# Patient Record
Sex: MALE | Race: BLACK OR AFRICAN AMERICAN | NOT HISPANIC OR LATINO | Employment: FULL TIME | URBAN - METROPOLITAN AREA
[De-identification: names, ages, dates, MRNs, and addresses within clinical notes are randomized per-mention and may not be internally consistent; named-entity substitution may affect disease eponyms.]

---

## 2019-07-12 ENCOUNTER — HOSPITAL ENCOUNTER (EMERGENCY)
Facility: HOSPITAL | Age: 29
Discharge: HOME | End: 2019-07-12
Attending: EMERGENCY MEDICINE
Payer: COMMERCIAL

## 2019-07-12 VITALS
HEIGHT: 75 IN | WEIGHT: 205 LBS | SYSTOLIC BLOOD PRESSURE: 128 MMHG | OXYGEN SATURATION: 98 % | HEART RATE: 70 BPM | TEMPERATURE: 98 F | BODY MASS INDEX: 25.49 KG/M2 | DIASTOLIC BLOOD PRESSURE: 60 MMHG | RESPIRATION RATE: 18 BRPM

## 2019-07-12 DIAGNOSIS — D57.00 SICKLE CELL ANEMIA WITH PAIN: Primary | ICD-10-CM

## 2019-07-12 LAB
ALBUMIN SERPL BCP-MCNC: 4 G/DL (ref 3.5–5.2)
ALP SERPL-CCNC: 51 U/L (ref 55–135)
ALT SERPL W/O P-5'-P-CCNC: 25 U/L (ref 10–44)
ANION GAP SERPL CALC-SCNC: 9 MMOL/L (ref 8–16)
AST SERPL-CCNC: 37 U/L (ref 10–40)
BASOPHILS # BLD AUTO: 0.06 K/UL (ref 0–0.2)
BASOPHILS NFR BLD: 0.6 % (ref 0–1.9)
BILIRUB SERPL-MCNC: 1.1 MG/DL (ref 0.1–1)
BUN SERPL-MCNC: 14 MG/DL (ref 6–20)
CALCIUM SERPL-MCNC: 9.1 MG/DL (ref 8.7–10.5)
CHLORIDE SERPL-SCNC: 104 MMOL/L (ref 95–110)
CO2 SERPL-SCNC: 25 MMOL/L (ref 23–29)
CREAT SERPL-MCNC: 1.4 MG/DL (ref 0.5–1.4)
DIFFERENTIAL METHOD: ABNORMAL
EOSINOPHIL # BLD AUTO: 0 K/UL (ref 0–0.5)
EOSINOPHIL NFR BLD: 0.1 % (ref 0–8)
ERYTHROCYTE [DISTWIDTH] IN BLOOD BY AUTOMATED COUNT: 14.1 % (ref 11.5–14.5)
EST. GFR  (AFRICAN AMERICAN): >60 ML/MIN/1.73 M^2
EST. GFR  (NON AFRICAN AMERICAN): >60 ML/MIN/1.73 M^2
GLUCOSE SERPL-MCNC: 121 MG/DL (ref 70–110)
HCT VFR BLD AUTO: 38.1 % (ref 40–54)
HGB BLD-MCNC: 13.5 G/DL (ref 14–18)
IMM GRANULOCYTES # BLD AUTO: 0.26 K/UL (ref 0–0.04)
IMM GRANULOCYTES NFR BLD AUTO: 2.8 % (ref 0–0.5)
LYMPHOCYTES # BLD AUTO: 0.9 K/UL (ref 1–4.8)
LYMPHOCYTES NFR BLD: 9.3 % (ref 18–48)
MCH RBC QN AUTO: 27.8 PG (ref 27–31)
MCHC RBC AUTO-ENTMCNC: 35.4 G/DL (ref 32–36)
MCV RBC AUTO: 79 FL (ref 82–98)
MONOCYTES # BLD AUTO: 0.6 K/UL (ref 0.3–1)
MONOCYTES NFR BLD: 6.7 % (ref 4–15)
NEUTROPHILS # BLD AUTO: 7.5 K/UL (ref 1.8–7.7)
NEUTROPHILS NFR BLD: 80.5 % (ref 38–73)
NRBC BLD-RTO: 0 /100 WBC
PLATELET # BLD AUTO: 157 K/UL (ref 150–350)
PMV BLD AUTO: 11.2 FL (ref 9.2–12.9)
POTASSIUM SERPL-SCNC: 3.9 MMOL/L (ref 3.5–5.1)
PROT SERPL-MCNC: 6.9 G/DL (ref 6–8.4)
RBC # BLD AUTO: 4.85 M/UL (ref 4.6–6.2)
RETICS/RBC NFR AUTO: 1.6 % (ref 0.4–2)
SODIUM SERPL-SCNC: 138 MMOL/L (ref 136–145)
WBC # BLD AUTO: 9.27 K/UL (ref 3.9–12.7)

## 2019-07-12 PROCEDURE — 99284 EMERGENCY DEPT VISIT MOD MDM: CPT | Mod: ,,, | Performed by: PHYSICIAN ASSISTANT

## 2019-07-12 PROCEDURE — 25000003 PHARM REV CODE 250: Performed by: PHYSICIAN ASSISTANT

## 2019-07-12 PROCEDURE — 85025 COMPLETE CBC W/AUTO DIFF WBC: CPT

## 2019-07-12 PROCEDURE — 99284 PR EMERGENCY DEPT VISIT,LEVEL IV: ICD-10-PCS | Mod: ,,, | Performed by: PHYSICIAN ASSISTANT

## 2019-07-12 PROCEDURE — 63600175 PHARM REV CODE 636 W HCPCS: Performed by: PHYSICIAN ASSISTANT

## 2019-07-12 PROCEDURE — 96361 HYDRATE IV INFUSION ADD-ON: CPT

## 2019-07-12 PROCEDURE — 80053 COMPREHEN METABOLIC PANEL: CPT

## 2019-07-12 PROCEDURE — 99284 EMERGENCY DEPT VISIT MOD MDM: CPT | Mod: 25

## 2019-07-12 PROCEDURE — 96374 THER/PROPH/DIAG INJ IV PUSH: CPT

## 2019-07-12 PROCEDURE — 85045 AUTOMATED RETICULOCYTE COUNT: CPT

## 2019-07-12 RX ORDER — IBUPROFEN 800 MG/1
800 TABLET ORAL 3 TIMES DAILY
COMMUNITY
End: 2023-09-10

## 2019-07-12 RX ORDER — ASPIRIN 325 MG
325 TABLET ORAL DAILY
COMMUNITY
End: 2023-09-10

## 2019-07-12 RX ORDER — KETOROLAC TROMETHAMINE 30 MG/ML
10 INJECTION, SOLUTION INTRAMUSCULAR; INTRAVENOUS
Status: COMPLETED | OUTPATIENT
Start: 2019-07-12 | End: 2019-07-12

## 2019-07-12 RX ADMIN — KETOROLAC TROMETHAMINE 10 MG: 30 INJECTION, SOLUTION INTRAMUSCULAR at 07:07

## 2019-07-12 RX ADMIN — SODIUM CHLORIDE 1000 ML: 0.9 INJECTION, SOLUTION INTRAVENOUS at 07:07

## 2019-07-12 NOTE — ED PROVIDER NOTES
Encounter Date: 7/12/2019       History     Chief Complaint   Patient presents with    Sickle Cell Pain Crisis     in lower back and legs since yesterday      Patient is a 29-year-old  male with a PMH of sickle cell anemia who presents to the ED for urgent evaluation for possible sickle cell pain crisis.  Patient reports onset of lower back pain and bilateral quadriceps pain beginning yesterday after playing basketball.  He states crises occasionally is brought on with exertion or stress.  He states the symptoms are typical of his pain crises.  His last episode was about 2 years ago.  He states pain is typically relieved with hydration, ibuprofen, and warm compresses.  He states he is not from the area and has not yet established care with a PCP.  He denies fever/chills, headache, chest pain, shortness of breath, abdominal pain, N/V/D, priapism, or extremity weakness.     The history is provided by the patient.     Review of patient's allergies indicates:  No Known Allergies  Past Medical History:   Diagnosis Date    Sickle cell anemia      History reviewed. No pertinent surgical history.  History reviewed. No pertinent family history.  Social History     Tobacco Use    Smoking status: Never Smoker    Smokeless tobacco: Never Used   Substance Use Topics    Alcohol use: Not Currently    Drug use: Never     Review of Systems   Constitutional: Negative for chills and fever.   HENT: Negative for sore throat.    Eyes: Negative for visual disturbance.   Respiratory: Negative for shortness of breath.    Cardiovascular: Negative for chest pain.   Gastrointestinal: Negative for abdominal pain, constipation, diarrhea, nausea and vomiting.   Genitourinary: Negative for dysuria and penile pain.   Musculoskeletal: Positive for back pain and myalgias.   Skin: Negative for wound.   Neurological: Negative for weakness and headaches.   Psychiatric/Behavioral: Negative for dysphoric mood.       Physical Exam      Initial Vitals [07/12/19 0704]   BP Pulse Resp Temp SpO2   128/60 70 18 97.9 °F (36.6 °C) 98 %      MAP       --         Physical Exam    Nursing note and vitals reviewed.  Constitutional: He appears well-developed and well-nourished. He is not diaphoretic. He appears distressed (mild painful).   HENT:   Head: Normocephalic and atraumatic.   Mouth/Throat: Oropharynx is clear and moist. No oropharyngeal exudate.   Eyes: Conjunctivae and EOM are normal. Pupils are equal, round, and reactive to light.   Neck: Normal range of motion. Neck supple.   Cardiovascular: Normal rate, regular rhythm, normal heart sounds and intact distal pulses.   Pulmonary/Chest: Breath sounds normal. No respiratory distress. He has no wheezes. He has no rales.   Musculoskeletal: Normal range of motion. He exhibits no edema or tenderness.   No C/T/L/S midline spinal tenderness. FROM and strength of extremities throughout. Normal gait, though back pain worsened with ambulation.   Neurological: He is alert and oriented to person, place, and time.   Skin: Skin is warm and dry.   Psychiatric: He has a normal mood and affect. Thought content normal.         ED Course   Procedures  Labs Reviewed   CBC W/ AUTO DIFFERENTIAL - Abnormal; Notable for the following components:       Result Value    Hemoglobin 13.5 (*)     Hematocrit 38.1 (*)     Mean Corpuscular Volume 79 (*)     Immature Granulocytes 2.8 (*)     Immature Grans (Abs) 0.26 (*)     Lymph # 0.9 (*)     Gran% 80.5 (*)     Lymph% 9.3 (*)     All other components within normal limits   COMPREHENSIVE METABOLIC PANEL - Abnormal; Notable for the following components:    Glucose 121 (*)     Total Bilirubin 1.1 (*)     Alkaline Phosphatase 51 (*)     All other components within normal limits   RETICULOCYTES          Imaging Results    None                APC / Resident Notes:   Patient presents for evaluation of sickle cell pain crisis, consistent with patient's prior crises. No evidence for  acute chest syndrome, severe anemia or red cell aplasia at this time.    Labs reveal no leukocytosis, normocytic anemia (Hgb 13.5/Hct 38.1), nl PLT. CMP remarkable for T-bili 1.1, AlkP 51. Normal retic count.     I do not suspect major complications. Patient was given Toradol and IV hydration. He is feeling better and pain is well controlled at this time.    Plan is discharge home with PCP follow-up. Encouraged hydration and OTC analgesics.   They were advised to return to the ED for any new or worsening symptoms. They were given the opportunity to ask questions, which were reasonably addressed to the best of my ability and their apparent satisfaction. I have discussed patient case with my supervisory physician, who is in agreement with my assessment and plan.                     Clinical Impression:       ICD-10-CM ICD-9-CM   1. Sickle cell anemia with pain D57.00 282.62         Disposition:   Disposition: Discharged  Condition: Stable                        Reny Hong PA-C  07/12/19 3162

## 2019-07-12 NOTE — DISCHARGE INSTRUCTIONS
Be sure to stay well-hydrated and get a full night's sleep.  Take over-the-counter Tylenol or Ibuprofen for pain relief.  Follow-up with your primary care provider.  Return to the ED for any concerning symptoms.    Our goal in the emergency department is to always give you outstanding care and exceptional service. You may receive a survey by mail or e-mail in the next week regarding your experience in our ED. We would greatly appreciate your completing and returning the survey. Your feedback provides us with a way to recognize our staff who give very good care and it helps us learn how to improve when your experience was below our aspiration of excellence.

## 2021-11-29 ENCOUNTER — OFFICE VISIT (OUTPATIENT)
Dept: URGENT CARE | Facility: CLINIC | Age: 31
End: 2021-11-29
Payer: COMMERCIAL

## 2021-11-29 ENCOUNTER — HOSPITAL ENCOUNTER (EMERGENCY)
Facility: HOSPITAL | Age: 31
Discharge: HOME | End: 2021-11-30
Attending: EMERGENCY MEDICINE
Payer: COMMERCIAL

## 2021-11-29 VITALS
TEMPERATURE: 98 F | HEIGHT: 72 IN | WEIGHT: 220 LBS | RESPIRATION RATE: 16 BRPM | OXYGEN SATURATION: 100 % | BODY MASS INDEX: 29.8 KG/M2 | HEART RATE: 97 BPM | SYSTOLIC BLOOD PRESSURE: 129 MMHG | DIASTOLIC BLOOD PRESSURE: 73 MMHG

## 2021-11-29 VITALS
RESPIRATION RATE: 16 BRPM | BODY MASS INDEX: 27.1 KG/M2 | HEIGHT: 75 IN | HEART RATE: 88 BPM | WEIGHT: 218 LBS | DIASTOLIC BLOOD PRESSURE: 90 MMHG | TEMPERATURE: 99 F | OXYGEN SATURATION: 98 % | SYSTOLIC BLOOD PRESSURE: 132 MMHG

## 2021-11-29 DIAGNOSIS — N30.00 ACUTE CYSTITIS WITHOUT HEMATURIA: ICD-10-CM

## 2021-11-29 DIAGNOSIS — K64.4 EXTERNAL HEMORRHOID: Primary | ICD-10-CM

## 2021-11-29 DIAGNOSIS — R30.0 DYSURIA: ICD-10-CM

## 2021-11-29 DIAGNOSIS — K64.9 HEMORRHOIDS, UNSPECIFIED HEMORRHOID TYPE: Primary | ICD-10-CM

## 2021-11-29 LAB
ALBUMIN SERPL BCP-MCNC: 3.8 G/DL (ref 3.5–5.2)
ALP SERPL-CCNC: 53 U/L (ref 55–135)
ALT SERPL W/O P-5'-P-CCNC: 19 U/L (ref 10–44)
ANION GAP SERPL CALC-SCNC: 9 MMOL/L (ref 8–16)
AST SERPL-CCNC: 22 U/L (ref 10–40)
BACTERIA #/AREA URNS AUTO: ABNORMAL /HPF
BASOPHILS # BLD AUTO: 0.04 K/UL (ref 0–0.2)
BASOPHILS NFR BLD: 0.3 % (ref 0–1.9)
BILIRUB SERPL-MCNC: 1.6 MG/DL (ref 0.1–1)
BILIRUB UR QL STRIP: NEGATIVE
BILIRUB UR QL STRIP: NEGATIVE
BUN SERPL-MCNC: 6 MG/DL (ref 6–20)
CALCIUM SERPL-MCNC: 9.5 MG/DL (ref 8.7–10.5)
CHLORIDE SERPL-SCNC: 103 MMOL/L (ref 95–110)
CLARITY UR REFRACT.AUTO: CLEAR
CO2 SERPL-SCNC: 26 MMOL/L (ref 23–29)
COLOR UR AUTO: YELLOW
CREAT SERPL-MCNC: 1.2 MG/DL (ref 0.5–1.4)
DIFFERENTIAL METHOD: ABNORMAL
EOSINOPHIL # BLD AUTO: 0 K/UL (ref 0–0.5)
EOSINOPHIL NFR BLD: 0.1 % (ref 0–8)
ERYTHROCYTE [DISTWIDTH] IN BLOOD BY AUTOMATED COUNT: 13.7 % (ref 11.5–14.5)
EST. GFR  (AFRICAN AMERICAN): >60 ML/MIN/1.73 M^2
EST. GFR  (NON AFRICAN AMERICAN): >60 ML/MIN/1.73 M^2
GLUCOSE SERPL-MCNC: 142 MG/DL (ref 70–110)
GLUCOSE UR QL STRIP: NEGATIVE
GLUCOSE UR QL STRIP: NEGATIVE
HCT VFR BLD AUTO: 38.6 % (ref 40–54)
HGB BLD-MCNC: 13.7 G/DL (ref 14–18)
HGB UR QL STRIP: NEGATIVE
IMM GRANULOCYTES # BLD AUTO: 0.05 K/UL (ref 0–0.04)
IMM GRANULOCYTES NFR BLD AUTO: 0.3 % (ref 0–0.5)
KETONES UR QL STRIP: NEGATIVE
KETONES UR QL STRIP: NEGATIVE
LEUKOCYTE ESTERASE UR QL STRIP: ABNORMAL
LEUKOCYTE ESTERASE UR QL STRIP: NEGATIVE
LYMPHOCYTES # BLD AUTO: 1.2 K/UL (ref 1–4.8)
LYMPHOCYTES NFR BLD: 7.7 % (ref 18–48)
MCH RBC QN AUTO: 27 PG (ref 27–31)
MCHC RBC AUTO-ENTMCNC: 35.5 G/DL (ref 32–36)
MCV RBC AUTO: 76 FL (ref 82–98)
MICROSCOPIC COMMENT: ABNORMAL
MONOCYTES # BLD AUTO: 1.4 K/UL (ref 0.3–1)
MONOCYTES NFR BLD: 9.1 % (ref 4–15)
NEUTROPHILS # BLD AUTO: 12.9 K/UL (ref 1.8–7.7)
NEUTROPHILS NFR BLD: 82.5 % (ref 38–73)
NITRITE UR QL STRIP: NEGATIVE
NRBC BLD-RTO: 0 /100 WBC
PH UR STRIP: 5 [PH] (ref 5–8)
PH, POC UA: 5.5 (ref 5–8)
PLATELET # BLD AUTO: 214 K/UL (ref 150–450)
PMV BLD AUTO: 11.3 FL (ref 9.2–12.9)
POC BLOOD, URINE: NEGATIVE
POC NITRATES, URINE: NEGATIVE
POTASSIUM SERPL-SCNC: 3.8 MMOL/L (ref 3.5–5.1)
PROT SERPL-MCNC: 6.8 G/DL (ref 6–8.4)
PROT UR QL STRIP: NEGATIVE
PROT UR QL STRIP: NEGATIVE
RBC # BLD AUTO: 5.07 M/UL (ref 4.6–6.2)
RBC #/AREA URNS AUTO: 2 /HPF (ref 0–4)
SODIUM SERPL-SCNC: 138 MMOL/L (ref 136–145)
SP GR UR STRIP: 1.01 (ref 1–1.03)
SP GR UR STRIP: 1.02 (ref 1–1.03)
SQUAMOUS #/AREA URNS AUTO: 0 /HPF
URN SPEC COLLECT METH UR: ABNORMAL
UROBILINOGEN UR STRIP-ACNC: NORMAL (ref 0.3–2.2)
WBC # BLD AUTO: 15.59 K/UL (ref 3.9–12.7)
WBC #/AREA URNS AUTO: 32 /HPF (ref 0–5)

## 2021-11-29 PROCEDURE — 99203 OFFICE O/P NEW LOW 30 MIN: CPT | Mod: 25,S$GLB,, | Performed by: PHYSICIAN ASSISTANT

## 2021-11-29 PROCEDURE — 81003 URINALYSIS AUTO W/O SCOPE: CPT | Mod: QW,S$GLB,, | Performed by: PHYSICIAN ASSISTANT

## 2021-11-29 PROCEDURE — 87389 HIV-1 AG W/HIV-1&-2 AB AG IA: CPT | Performed by: EMERGENCY MEDICINE

## 2021-11-29 PROCEDURE — 99284 EMERGENCY DEPT VISIT MOD MDM: CPT | Mod: ,,, | Performed by: PHYSICIAN ASSISTANT

## 2021-11-29 PROCEDURE — 96360 HYDRATION IV INFUSION INIT: CPT

## 2021-11-29 PROCEDURE — 80053 COMPREHEN METABOLIC PANEL: CPT | Performed by: EMERGENCY MEDICINE

## 2021-11-29 PROCEDURE — 85025 COMPLETE CBC W/AUTO DIFF WBC: CPT | Performed by: EMERGENCY MEDICINE

## 2021-11-29 PROCEDURE — 87086 URINE CULTURE/COLONY COUNT: CPT | Performed by: EMERGENCY MEDICINE

## 2021-11-29 PROCEDURE — 99284 PR EMERGENCY DEPT VISIT,LEVEL IV: ICD-10-PCS | Mod: ,,, | Performed by: PHYSICIAN ASSISTANT

## 2021-11-29 PROCEDURE — 99284 EMERGENCY DEPT VISIT MOD MDM: CPT | Mod: 25

## 2021-11-29 PROCEDURE — 86803 HEPATITIS C AB TEST: CPT | Performed by: EMERGENCY MEDICINE

## 2021-11-29 PROCEDURE — 99203 PR OFFICE/OUTPT VISIT, NEW, LEVL III, 30-44 MIN: ICD-10-PCS | Mod: 25,S$GLB,, | Performed by: PHYSICIAN ASSISTANT

## 2021-11-29 PROCEDURE — 81001 URINALYSIS AUTO W/SCOPE: CPT | Performed by: EMERGENCY MEDICINE

## 2021-11-29 PROCEDURE — 25000003 PHARM REV CODE 250: Performed by: PHYSICIAN ASSISTANT

## 2021-11-29 PROCEDURE — 81003 POCT URINALYSIS, DIPSTICK, AUTOMATED, W/O SCOPE: ICD-10-PCS | Mod: QW,S$GLB,, | Performed by: PHYSICIAN ASSISTANT

## 2021-11-29 RX ORDER — HYDROCORTISONE 25 MG/G
CREAM TOPICAL 2 TIMES DAILY
Qty: 3.5 G | Refills: 0 | Status: SHIPPED | OUTPATIENT
Start: 2021-11-29 | End: 2023-09-10

## 2021-11-29 RX ADMIN — SODIUM CHLORIDE 500 ML: 0.9 INJECTION, SOLUTION INTRAVENOUS at 10:11

## 2021-11-30 LAB
HCV AB SERPL QL IA: NEGATIVE
HIV 1+2 AB+HIV1 P24 AG SERPL QL IA: NEGATIVE

## 2021-11-30 RX ORDER — CEPHALEXIN 500 MG/1
500 CAPSULE ORAL 4 TIMES DAILY
Qty: 40 CAPSULE | Refills: 0 | Status: SHIPPED | OUTPATIENT
Start: 2021-11-30 | End: 2021-12-10

## 2021-12-01 LAB — BACTERIA UR CULT: NO GROWTH

## 2023-03-27 ENCOUNTER — OFFICE VISIT (OUTPATIENT)
Dept: URGENT CARE | Facility: CLINIC | Age: 33
End: 2023-03-27
Payer: COMMERCIAL

## 2023-03-27 VITALS
WEIGHT: 220 LBS | BODY MASS INDEX: 29.8 KG/M2 | RESPIRATION RATE: 18 BRPM | OXYGEN SATURATION: 98 % | HEART RATE: 92 BPM | DIASTOLIC BLOOD PRESSURE: 97 MMHG | SYSTOLIC BLOOD PRESSURE: 152 MMHG | TEMPERATURE: 99 F | HEIGHT: 72 IN

## 2023-03-27 DIAGNOSIS — K64.8 INTERNAL HEMORRHOIDS: Primary | ICD-10-CM

## 2023-03-27 PROCEDURE — 99213 OFFICE O/P EST LOW 20 MIN: CPT | Mod: S$GLB,,, | Performed by: FAMILY MEDICINE

## 2023-03-27 PROCEDURE — 99213 PR OFFICE/OUTPT VISIT, EST, LEVL III, 20-29 MIN: ICD-10-PCS | Mod: S$GLB,,, | Performed by: FAMILY MEDICINE

## 2023-03-27 RX ORDER — LIDOCAINE HCL AND HYDROCORTISONE ACETATE 20; 20 MG/G; MG/G
1 CREAM RECTAL 2 TIMES DAILY
Qty: 1 KIT | Refills: 1 | Status: SHIPPED | OUTPATIENT
Start: 2023-03-27 | End: 2023-09-10

## 2023-03-27 RX ORDER — HYDROCORTISONE ACETATE 25 MG/1
25 SUPPOSITORY RECTAL 2 TIMES DAILY
Qty: 20 SUPPOSITORY | Refills: 0 | Status: SHIPPED | OUTPATIENT
Start: 2023-03-27 | End: 2023-04-06

## 2023-03-28 ENCOUNTER — TELEPHONE (OUTPATIENT)
Dept: SURGERY | Facility: CLINIC | Age: 33
End: 2023-03-28
Payer: COMMERCIAL

## 2023-03-28 ENCOUNTER — HOSPITAL ENCOUNTER (EMERGENCY)
Facility: HOSPITAL | Age: 33
Discharge: HOME | End: 2023-03-28
Attending: EMERGENCY MEDICINE
Payer: COMMERCIAL

## 2023-03-28 VITALS
TEMPERATURE: 99 F | OXYGEN SATURATION: 100 % | DIASTOLIC BLOOD PRESSURE: 77 MMHG | SYSTOLIC BLOOD PRESSURE: 130 MMHG | HEART RATE: 88 BPM | RESPIRATION RATE: 16 BRPM

## 2023-03-28 DIAGNOSIS — K61.0 PERIANAL ABSCESS: Primary | ICD-10-CM

## 2023-03-28 LAB
ALBUMIN SERPL BCP-MCNC: 3.9 G/DL (ref 3.5–5.2)
ALP SERPL-CCNC: 52 U/L (ref 55–135)
ALT SERPL W/O P-5'-P-CCNC: 16 U/L (ref 10–44)
ANION GAP SERPL CALC-SCNC: 11 MMOL/L (ref 8–16)
AST SERPL-CCNC: 18 U/L (ref 10–40)
BASOPHILS # BLD AUTO: 0.04 K/UL (ref 0–0.2)
BASOPHILS NFR BLD: 0.3 % (ref 0–1.9)
BILIRUB SERPL-MCNC: 1.3 MG/DL (ref 0.1–1)
BILIRUB UR QL STRIP: NEGATIVE
BUN SERPL-MCNC: 7 MG/DL (ref 6–20)
BUN SERPL-MCNC: 7 MG/DL (ref 6–30)
CALCIUM SERPL-MCNC: 9.8 MG/DL (ref 8.7–10.5)
CHLORIDE SERPL-SCNC: 100 MMOL/L (ref 95–110)
CHLORIDE SERPL-SCNC: 103 MMOL/L (ref 95–110)
CLARITY UR REFRACT.AUTO: CLEAR
CO2 SERPL-SCNC: 26 MMOL/L (ref 23–29)
COLOR UR AUTO: YELLOW
CREAT SERPL-MCNC: 1.3 MG/DL (ref 0.5–1.4)
CREAT SERPL-MCNC: 1.3 MG/DL (ref 0.5–1.4)
DIFFERENTIAL METHOD: ABNORMAL
EOSINOPHIL # BLD AUTO: 0 K/UL (ref 0–0.5)
EOSINOPHIL NFR BLD: 0.1 % (ref 0–8)
ERYTHROCYTE [DISTWIDTH] IN BLOOD BY AUTOMATED COUNT: 13.8 % (ref 11.5–14.5)
EST. GFR  (NO RACE VARIABLE): >60 ML/MIN/1.73 M^2
GLUCOSE SERPL-MCNC: 143 MG/DL (ref 70–110)
GLUCOSE SERPL-MCNC: 146 MG/DL (ref 70–110)
GLUCOSE UR QL STRIP: NEGATIVE
HCT VFR BLD AUTO: 37 % (ref 40–54)
HCT VFR BLD CALC: 40 %PCV (ref 36–54)
HCV AB SERPL QL IA: NORMAL
HGB BLD-MCNC: 13.3 G/DL (ref 14–18)
HGB UR QL STRIP: NEGATIVE
HIV 1+2 AB+HIV1 P24 AG SERPL QL IA: NORMAL
IMM GRANULOCYTES # BLD AUTO: 0.07 K/UL (ref 0–0.04)
IMM GRANULOCYTES NFR BLD AUTO: 0.4 % (ref 0–0.5)
KETONES UR QL STRIP: NEGATIVE
LEUKOCYTE ESTERASE UR QL STRIP: ABNORMAL
LYMPHOCYTES # BLD AUTO: 1.2 K/UL (ref 1–4.8)
LYMPHOCYTES NFR BLD: 7.5 % (ref 18–48)
MCH RBC QN AUTO: 27.3 PG (ref 27–31)
MCHC RBC AUTO-ENTMCNC: 35.9 G/DL (ref 32–36)
MCV RBC AUTO: 76 FL (ref 82–98)
MICROSCOPIC COMMENT: ABNORMAL
MONOCYTES # BLD AUTO: 1.2 K/UL (ref 0.3–1)
MONOCYTES NFR BLD: 7.8 % (ref 4–15)
NEUTROPHILS # BLD AUTO: 13.2 K/UL (ref 1.8–7.7)
NEUTROPHILS NFR BLD: 83.9 % (ref 38–73)
NITRITE UR QL STRIP: NEGATIVE
NRBC BLD-RTO: 0 /100 WBC
PH UR STRIP: 6 [PH] (ref 5–8)
PLATELET # BLD AUTO: 196 K/UL (ref 150–450)
PMV BLD AUTO: 11 FL (ref 9.2–12.9)
POC IONIZED CALCIUM: 1.19 MMOL/L (ref 1.06–1.42)
POC TCO2 (MEASURED): 28 MMOL/L (ref 23–29)
POTASSIUM BLD-SCNC: 3.7 MMOL/L (ref 3.5–5.1)
POTASSIUM SERPL-SCNC: 3.7 MMOL/L (ref 3.5–5.1)
PROT SERPL-MCNC: 7.2 G/DL (ref 6–8.4)
PROT UR QL STRIP: NEGATIVE
RBC # BLD AUTO: 4.88 M/UL (ref 4.6–6.2)
RBC #/AREA URNS AUTO: 1 /HPF (ref 0–4)
SAMPLE: ABNORMAL
SODIUM BLD-SCNC: 138 MMOL/L (ref 136–145)
SODIUM SERPL-SCNC: 140 MMOL/L (ref 136–145)
SP GR UR STRIP: 1.01 (ref 1–1.03)
SQUAMOUS #/AREA URNS AUTO: 1 /HPF
URN SPEC COLLECT METH UR: ABNORMAL
WBC # BLD AUTO: 15.77 K/UL (ref 3.9–12.7)
WBC #/AREA URNS AUTO: 11 /HPF (ref 0–5)

## 2023-03-28 PROCEDURE — 82962 GLUCOSE BLOOD TEST: CPT

## 2023-03-28 PROCEDURE — 87389 HIV-1 AG W/HIV-1&-2 AB AG IA: CPT | Performed by: PHYSICIAN ASSISTANT

## 2023-03-28 PROCEDURE — 81001 URINALYSIS AUTO W/SCOPE: CPT | Performed by: PHYSICIAN ASSISTANT

## 2023-03-28 PROCEDURE — 96360 HYDRATION IV INFUSION INIT: CPT

## 2023-03-28 PROCEDURE — 99284 EMERGENCY DEPT VISIT MOD MDM: CPT | Mod: ,,, | Performed by: PHYSICIAN ASSISTANT

## 2023-03-28 PROCEDURE — 25500020 PHARM REV CODE 255: Performed by: EMERGENCY MEDICINE

## 2023-03-28 PROCEDURE — 87086 URINE CULTURE/COLONY COUNT: CPT | Performed by: PHYSICIAN ASSISTANT

## 2023-03-28 PROCEDURE — 25000003 PHARM REV CODE 250: Performed by: PHYSICIAN ASSISTANT

## 2023-03-28 PROCEDURE — 99285 EMERGENCY DEPT VISIT HI MDM: CPT | Mod: 25

## 2023-03-28 PROCEDURE — 99284 PR EMERGENCY DEPT VISIT,LEVEL IV: ICD-10-PCS | Mod: ,,, | Performed by: PHYSICIAN ASSISTANT

## 2023-03-28 PROCEDURE — 80047 BASIC METABLC PNL IONIZED CA: CPT

## 2023-03-28 PROCEDURE — 82565 ASSAY OF CREATININE: CPT | Mod: 59

## 2023-03-28 PROCEDURE — 82330 ASSAY OF CALCIUM: CPT

## 2023-03-28 PROCEDURE — 46050 I&D PERIANAL ABSCESS SUPFC: CPT

## 2023-03-28 PROCEDURE — 80053 COMPREHEN METABOLIC PANEL: CPT | Performed by: PHYSICIAN ASSISTANT

## 2023-03-28 PROCEDURE — 85025 COMPLETE CBC W/AUTO DIFF WBC: CPT | Performed by: PHYSICIAN ASSISTANT

## 2023-03-28 PROCEDURE — 86803 HEPATITIS C AB TEST: CPT | Performed by: PHYSICIAN ASSISTANT

## 2023-03-28 RX ORDER — AMOXICILLIN AND CLAVULANATE POTASSIUM 875; 125 MG/1; MG/1
1 TABLET, FILM COATED ORAL EVERY 12 HOURS
Qty: 8 TABLET | Refills: 0 | Status: SHIPPED | OUTPATIENT
Start: 2023-03-28 | End: 2023-04-01

## 2023-03-28 RX ORDER — MORPHINE SULFATE 4 MG/ML
4 INJECTION, SOLUTION INTRAMUSCULAR; INTRAVENOUS
Status: DISCONTINUED | OUTPATIENT
Start: 2023-03-28 | End: 2023-03-28 | Stop reason: HOSPADM

## 2023-03-28 RX ORDER — HYDROCODONE BITARTRATE AND ACETAMINOPHEN 5; 325 MG/1; MG/1
1 TABLET ORAL EVERY 6 HOURS PRN
Qty: 12 TABLET | Refills: 0 | Status: SHIPPED | OUTPATIENT
Start: 2023-03-28 | End: 2023-09-10

## 2023-03-28 RX ORDER — KETOROLAC TROMETHAMINE 30 MG/ML
10 INJECTION, SOLUTION INTRAMUSCULAR; INTRAVENOUS
Status: DISCONTINUED | OUTPATIENT
Start: 2023-03-28 | End: 2023-03-28

## 2023-03-28 RX ORDER — ACETAMINOPHEN 325 MG/1
650 TABLET ORAL
Status: COMPLETED | OUTPATIENT
Start: 2023-03-28 | End: 2023-03-28

## 2023-03-28 RX ADMIN — SODIUM CHLORIDE 1000 ML: 9 INJECTION, SOLUTION INTRAVENOUS at 08:03

## 2023-03-28 RX ADMIN — ACETAMINOPHEN 650 MG: 325 TABLET ORAL at 11:03

## 2023-03-28 RX ADMIN — IOHEXOL 100 ML: 350 INJECTION, SOLUTION INTRAVENOUS at 09:03

## 2023-03-28 NOTE — H&P
Subjective:       Patient ID: Jose Juan Shultz is a 32 y.o. male.    Chief Complaint: Abscess (Pt states thinks has rectal abscess. States noticed bump near rectum 2 days ago. Seen at  yesterday and treated for hemorrhoids. Today states bump is bigger, red and more painful. Currently rates pain 10/10. Also reports difficulty urinating )    HPI 32M presenting with 3 days of progressively worsening right sided perianal pain and swelling. He presented to urgent care yesterday and was treated medically for hemorrhoids.     Denies prior episodes of perianal pain.  Reports hx of external hemorrhoid treated symptomatically (2021), no procedure  Denies drainage  Denies blood per anus    Last colonoscopy - never  Denies family hx of CRC or IBD.      Review of patient's allergies indicates:  No Known Allergies    Past Medical History:   Diagnosis Date    Sickle cell anemia        No past surgical history on file.    Current Facility-Administered Medications   Medication Dose Route Frequency Provider Last Rate Last Admin    morphine injection 4 mg  4 mg Intravenous ED 1 Time Tamie Salazar PA-C         Current Outpatient Medications   Medication Sig Dispense Refill    ibuprofen (ADVIL,MOTRIN) 800 MG tablet Take 800 mg by mouth 3 (three) times daily.      aspirin 325 MG tablet Take 325 mg by mouth once daily.      HYDROcodone-acetaminophen (NORCO) 5-325 mg per tablet Take 1 tablet by mouth every 6 (six) hours as needed for Pain. 12 tablet 0    hydrocortisone (ANUSOL-HC) 25 mg suppository Place 1 suppository (25 mg total) rectally 2 (two) times daily. for 10 days 20 suppository 0    hydrocortisone 2.5 % cream Apply topically 2 (two) times daily. (Patient not taking: Reported on 3/27/2023) 3.5 g 0    lidocaine/hydrocortisone ac (LIDOCAINE HCL-HYDROCORTISON AC) 2 %-2 % (7 gram) Kit Place 1 applicator rectally 2 (two) times daily. 1 kit 1       No family history on file.    Social History     Socioeconomic  History    Marital status: Single   Tobacco Use    Smoking status: Never    Smokeless tobacco: Never   Substance and Sexual Activity    Alcohol use: Not Currently    Drug use: Never    Sexual activity: Not Currently       Review of Systems    Objective:      Physical Exam      Physical Exam:  /68   Pulse 68   Temp 99.4 °F (37.4 °C) (Oral)   Resp 20   SpO2 99%   General: no distress  Head: normocephalic  Mallampati Score   Neck: supple, symmetrical, trachea midline  Lungs:  normal respiratory effort  Heart: regular rate and rhythm  Abdomen: soft, non-tender non-distented; bowel sounds normal; no masses,  no organomegaly  Extremities: no cyanosis or edema, or clubbing    Anorectal Exam:    Anal Skin:  Swelling noted 1cm from anus, right lateral    Did not tolerate digital exam until abscess drained.    Consent for procedure obtained.    Patient positioned in right lateral decubitus  Area prepped with betadine  Area injected with 5cc of 1% lidocaine with epinephrine  Elliptical incision made 1cm from anus, carefully avoiding the sphincter complex  Copious pus evacuated  Irrigated with 10cc saline x3  Area packed    Digital Rectal Exam: Did not tolerate prior to drainage; after drainage:  Resting Tone normal  Squeeze normal  Mass none  Tenderness  present, markedly reduced after procedure    Anoscopy: unable to genesis      Lab Results   Component Value Date    WBC 15.77 (H) 03/28/2023    HGB 13.3 (L) 03/28/2023    HCT 40 03/28/2023    MCV 76 (L) 03/28/2023     03/28/2023     BMP  Lab Results   Component Value Date     03/28/2023    K 3.7 03/28/2023     03/28/2023    CO2 26 03/28/2023    BUN 7 03/28/2023    CREATININE 1.3 03/28/2023    CALCIUM 9.8 03/28/2023    ANIONGAP 11 03/28/2023    ESTGFRAFRICA >60.0 11/29/2021    EGFRNONAA >60.0 11/29/2021     CMP  Sodium   Date Value Ref Range Status   03/28/2023 140 136 - 145 mmol/L Final     Potassium   Date Value Ref Range Status   03/28/2023 3.7 3.5 -  5.1 mmol/L Final     Chloride   Date Value Ref Range Status   03/28/2023 103 95 - 110 mmol/L Final     CO2   Date Value Ref Range Status   03/28/2023 26 23 - 29 mmol/L Final     Glucose   Date Value Ref Range Status   03/28/2023 143 (H) 70 - 110 mg/dL Final     BUN   Date Value Ref Range Status   03/28/2023 7 6 - 20 mg/dL Final     Creatinine   Date Value Ref Range Status   03/28/2023 1.3 0.5 - 1.4 mg/dL Final     Calcium   Date Value Ref Range Status   03/28/2023 9.8 8.7 - 10.5 mg/dL Final     Total Protein   Date Value Ref Range Status   03/28/2023 7.2 6.0 - 8.4 g/dL Final     Albumin   Date Value Ref Range Status   03/28/2023 3.9 3.5 - 5.2 g/dL Final     Total Bilirubin   Date Value Ref Range Status   03/28/2023 1.3 (H) 0.1 - 1.0 mg/dL Final     Comment:     For infants and newborns, interpretation of results should be based  on gestational age, weight and in agreement with clinical  observations.    Premature Infant recommended reference ranges:  Up to 24 hours.............<8.0 mg/dL  Up to 48 hours............<12.0 mg/dL  3-5 days..................<15.0 mg/dL  6-29 days.................<15.0 mg/dL       Alkaline Phosphatase   Date Value Ref Range Status   03/28/2023 52 (L) 55 - 135 U/L Final     AST   Date Value Ref Range Status   03/28/2023 18 10 - 40 U/L Final     ALT   Date Value Ref Range Status   03/28/2023 16 10 - 44 U/L Final     Anion Gap   Date Value Ref Range Status   03/28/2023 11 8 - 16 mmol/L Final     eGFR if    Date Value Ref Range Status   11/29/2021 >60.0 >60 mL/min/1.73 m^2 Final     eGFR if non    Date Value Ref Range Status   11/29/2021 >60.0 >60 mL/min/1.73 m^2 Final     Comment:     Calculation used to obtain the estimated glomerular filtration  rate (eGFR) is the CKD-EPI equation.        No results found for: CEA    CT reviewed personally, correlated with exam      Assessment:       1. Perianal abscess          Plan:       32M now s/p drainage of perianal  abscess (right lateral)  under local analgesia in the emergency room. Tolerate procedure well, reported resolution of pain.    In the setting of inflammation, recommend 4 days of antibiotics and follow up in clinic in 1-2 weeks.    Staffed with Dr. Reymundo Morin MD  Department of Colon & Rectal Surgery

## 2023-03-28 NOTE — ED NOTES
iSTAT Chem 8    Na+ 138   K+ 3.7   Cl 100   iCa 1.19   TCO2 28   Glu 146   BUN 7   Creat 1.3   Hct% 40

## 2023-03-28 NOTE — ED PROVIDER NOTES
Encounter Date: 3/28/2023       History     Chief Complaint   Patient presents with    Abscess     Pt states thinks has rectal abscess. States noticed bump near rectum 2 days ago. Seen at  yesterday and treated for hemorrhoids. Today states bump is bigger, red and more painful. Currently rates pain 10/10. Also reports difficulty urinating      32-year-old male with past medical history of sickle cell anemia presents to the emergency department with chief complaint of rectal pain that began 3 days ago.  He was evaluated at urgent care yesterday.  He was diagnosed with hemorrhoids.  He was given a hydrocortisone suppository.  This did not provide him much relief.  He is also taking ibuprofen as needed for pain.  His last bowel movement was yesterday, but he does feel constipated.  He is also having difficulty urinating.  He states that the only that he can urinate in his when he is sitting in the bathtub.  He denies hematuria or discharge.  He denies abdominal pain or back pain.  He denies fever but does endorse chills.  He denies other worsening or alleviating factors.    Review of patient's allergies indicates:  No Known Allergies  Past Medical History:   Diagnosis Date    Sickle cell anemia      No past surgical history on file.  No family history on file.  Social History     Tobacco Use    Smoking status: Never    Smokeless tobacco: Never   Substance Use Topics    Alcohol use: Not Currently    Drug use: Never     Review of Systems   Gastrointestinal:  Positive for rectal pain.   Genitourinary:  Positive for difficulty urinating.     Physical Exam     Initial Vitals [03/28/23 0653]   BP Pulse Resp Temp SpO2   (!) 166/85 93 20 99.4 °F (37.4 °C) 98 %      MAP       --         Physical Exam    Nursing note and vitals reviewed.  Constitutional: He appears well-developed and well-nourished. He is not diaphoretic. No distress.   HENT:   Head: Normocephalic and atraumatic.   Mouth/Throat: Oropharynx is clear and moist.    Eyes: EOM are normal. Pupils are equal, round, and reactive to light.   Neck: Neck supple.   Normal range of motion.  Cardiovascular:  Normal rate, regular rhythm, normal heart sounds and intact distal pulses.     Exam reveals no gallop and no friction rub.       No murmur heard.  Pulmonary/Chest: Breath sounds normal. He has no wheezes. He has no rhonchi. He has no rales.   Abdominal: Abdomen is soft. Bowel sounds are normal. There is no abdominal tenderness. There is no rebound and no guarding.   Genitourinary:    Genitourinary Comments: Rectal exam performed with nursing chaperone present  1.5 cm area of erythema/fluctuance at the 1 o'clock position      Musculoskeletal:         General: Normal range of motion.      Cervical back: Normal range of motion and neck supple.     Neurological: He is alert and oriented to person, place, and time. He has normal strength. GCS score is 15. GCS eye subscore is 4. GCS verbal subscore is 5. GCS motor subscore is 6.   Skin: Skin is warm and dry. Capillary refill takes less than 2 seconds.   Psychiatric: He has a normal mood and affect. His behavior is normal. Judgment and thought content normal.       ED Course   Procedures  Labs Reviewed   CBC W/ AUTO DIFFERENTIAL - Abnormal; Notable for the following components:       Result Value    WBC 15.77 (*)     Hemoglobin 13.3 (*)     Hematocrit 37.0 (*)     MCV 76 (*)     Gran # (ANC) 13.2 (*)     Immature Grans (Abs) 0.07 (*)     Mono # 1.2 (*)     Gran % 83.9 (*)     Lymph % 7.5 (*)     All other components within normal limits   COMPREHENSIVE METABOLIC PANEL - Abnormal; Notable for the following components:    Glucose 143 (*)     Total Bilirubin 1.3 (*)     Alkaline Phosphatase 52 (*)     All other components within normal limits   URINALYSIS, REFLEX TO URINE CULTURE - Abnormal; Notable for the following components:    Leukocytes, UA 1+ (*)     All other components within normal limits    Narrative:     Specimen Source->Urine    URINALYSIS MICROSCOPIC - Abnormal; Notable for the following components:    WBC, UA 11 (*)     All other components within normal limits    Narrative:     Specimen Source->Urine   ISTAT PROCEDURE - Abnormal; Notable for the following components:    POC Glucose 146 (*)     All other components within normal limits   CULTURE, URINE   HIV 1 / 2 ANTIBODY    Narrative:     Release to patient->Immediate   HEPATITIS C ANTIBODY    Narrative:     Release to patient->Immediate   ISTAT CHEM8          Imaging Results               CT Abdomen Pelvis With Contrast (Final result)  Result time 03/28/23 10:18:55      Final result by Eric Tamayo IV, MD (03/28/23 10:18:55)                   Impression:      Perianal collections concerning for perianal abscesses as above.  Recommend correlation with history/exam.    This report was flagged in Epic as abnormal.    Electronically signed by resident: Tristian Winn  Date:    03/28/2023  Time:    09:08    Electronically signed by: Eric Tamayo  Date:    03/28/2023  Time:    10:18               Narrative:    EXAMINATION:  CT ABDOMEN PELVIS WITH CONTRAST    CLINICAL HISTORY:  rectal pain, r/o perianal abscess;    TECHNIQUE:  The patient was surveyed from the lung bases through the pelvis after the administration of 100 cc Omni 350 IV contrast. No oral contrast was administered. The data was reconstructed for coronal, sagittal, and axial images.    COMPARISON:  None.    FINDINGS:  CHEST:    Lungs/Pleura: No focal consolidation or pleural effusion in the visualized lungs.    Heart: Normal size.  No pericardial effusion.    Thoracic soft tissues: No significant abnormality.    ABDOMEN/PELVIS:    Liver: Normal size with smooth contours.  Punctate hypodensity in the right hepatic lobe which is too small to characterize.  Portal vasculature is patent.    Gallbladder: Normally distended without calcified gallstones.  No pericholecystic inflammatory changes.    Bile ducts: No intrahepatic  or extrahepatic biliary ductal dilatation.    Spleen: Normal size.    Pancreas: No mass.  No ductal dilatation. No peripancreatic fat stranding.    Adrenals: Right adrenal is unremarkable.  Mild nodular thickening of left adrenal gland, nonspecific.    Renal/Ureters: The kidneys are normal in size and enhance symmetrically.  Bilateral fluid attenuating hypodensities most compatible with cysts.  No hydronephrosis.  The ureters are normal in course and caliber. The urinary bladder is distended with smooth wall contours.    Reproductive: Prostate is unremarkable.    Stomach/Bowel: Stomach is unremarkable.  Small bowel is normal caliber without obstruction or inflammation.  Appendix is normal.  Large bowel is normal caliber without obstruction or inflammation.  There is a right perianal hypoattenuating collection measuring 4.2 x 1.7 cm.  Smaller hypoattenuating collection in the left perianal region measuring on the order of 0.9 cm.  Larger collection extends inferiorly out of the field of view.  Findings are concerning for abscess.    Peritoneum: No free fluid. No intraperitoneal free air.    Lymph Nodes: No pathologic guanako enlargement in the abdomen or pelvis.    Vasculature: Abdominal aorta is normal in course and caliber.    Bones: No acute fractures or osseous destructive lesions.    Soft Tissues: Prominent inguinal lymph nodes of likely reactive.                                       Medications   morphine injection 4 mg (4 mg Intravenous Not Given 3/28/23 0730)   sodium chloride 0.9% bolus 1,000 mL 1,000 mL (0 mLs Intravenous Stopped 3/28/23 0933)   acetaminophen tablet 650 mg (650 mg Oral Given 3/28/23 1130)   iohexoL (OMNIPAQUE 350) injection 100 mL (100 mLs Intravenous Given 3/28/23 0905)     Medical Decision Making:   History:   Old Medical Records: I decided to obtain old medical records.  Initial Assessment:   Emergent evaluation of a 32 y.o. male presenting to the emergency department complaining of  rectal pain and difficulty urinating x 3 days. Patient is afebrile, hemodynamically stable, and non toxic appearing.   Will order labs, imaging, analgesia.   Differential Diagnosis:   Ddx includes but is not limited to anal abscess, fistula, cellulitis, hemorrhoid.   Clinical Tests:   Lab Tests: Ordered and Reviewed  Radiological Study: Ordered and Reviewed  ED Management:  Leukocytosis of 15.77.  Hyperglycemia of 143.  Kidney function baseline.  Total bilirubin 1.3, similar to prior.    UA with 1+ leukocyte, 11 wbc's. Inconsistent with infection. Will send urine culture.   CT abdomen pelvis reveals perianal collections concerning for abscesses.   Will consult CRS.     Colorectal surgery has evaluated the patient.  They have performed incision and drainage with copious purulence expressed.  Will start on Augmentin.  Patient instructed to take Tylenol and ibuprofen as needed for mild-to-moderate pain.  Will discharge with [place] as needed for more severe pain.  Return precautions.  All questions answered.  The patient was instructed to follow up with CRS or to return to the emergency department for worsening symptoms. The treatment plan was discussed with the patient who demonstrated understanding and comfort with plan. The patient's history, physical exam, and plan of care was discussed with and agreed upon with my supervising physician.                ED Course as of 03/28/23 1504   Tue Mar 28, 2023   0818 WBC(!): 15.77 [JM]   0818 Hemoglobin(!): 13.3 [JM]   0818 Hematocrit(!): 37.0 [JM]   0818 Platelets: 196 [JM]   0832 Glucose(!): 143 [JM]   0832 Creatinine: 1.3 [JM]   0832 BILIRUBIN TOTAL(!): 1.3 [JM]      ED Course User Index  [JM] Tamie Salazar PA-C                 Clinical Impression:   Final diagnoses:  [K61.0] Perianal abscess (Primary)        ED Disposition Condition    Discharge Stable          ED Prescriptions       Medication Sig Dispense Start Date End Date Auth. Provider     HYDROcodone-acetaminophen (NORCO) 5-325 mg per tablet Take 1 tablet by mouth every 6 (six) hours as needed for Pain. 12 tablet 3/28/2023 -- Eliceo Koroma MD    amoxicillin-clavulanate 875-125mg (AUGMENTIN) 875-125 mg per tablet Take 1 tablet by mouth every 12 (twelve) hours. for 4 days 8 tablet 3/28/2023 4/1/2023 Eliceo Koroma MD          Follow-up Information       Follow up With Specialties Details Why Contact Info    Anastacia Dwyer MD Colon and Rectal Surgery   [address] [facility] [id number]  [phone]      [facility] - Emergency Dept Emergency Medicine  Return to ED for worsening symptoms, inability to eat/drink, fever greater than 100.4, or any other concerns. [address] [facility] [id number] [phone]             Tamie Salazar PA-C  03/28/23 1505

## 2023-03-28 NOTE — ED TRIAGE NOTES
Pt reports having pain in rectum for the past three days, and noticed a red lump on the R side his anus this morning. Pt reports going to urgent care yesterday morning and was prescribed hydrocortisone suppositories and had no relief. Pt reports taking 600mg ibuprofen this morning. Pt denies past medical hx. Pt AAOx4.

## 2023-03-28 NOTE — Clinical Note
"Jose Juan"Nena Shultz was seen and treated in our emergency department on 3/28/2023.  He may return to work on 03/30/2023.       If you have any questions or concerns, please don't hesitate to call.      Eliceo Koroma MD"

## 2023-03-29 LAB — BACTERIA UR CULT: NO GROWTH

## 2023-09-10 ENCOUNTER — HOSPITAL ENCOUNTER (EMERGENCY)
Facility: HOSPITAL | Age: 33
Discharge: HOME | End: 2023-09-10
Attending: EMERGENCY MEDICINE
Payer: COMMERCIAL

## 2023-09-10 VITALS
DIASTOLIC BLOOD PRESSURE: 77 MMHG | WEIGHT: 222 LBS | BODY MASS INDEX: 27.6 KG/M2 | TEMPERATURE: 99 F | SYSTOLIC BLOOD PRESSURE: 135 MMHG | HEIGHT: 75 IN | OXYGEN SATURATION: 99 % | HEART RATE: 70 BPM | RESPIRATION RATE: 20 BRPM

## 2023-09-10 DIAGNOSIS — R03.0 ELEVATED BLOOD PRESSURE READING: ICD-10-CM

## 2023-09-10 DIAGNOSIS — K14.6 TONGUE PAIN: Primary | ICD-10-CM

## 2023-09-10 PROCEDURE — 99281 EMR DPT VST MAYX REQ PHY/QHP: CPT

## 2023-09-11 NOTE — DISCHARGE INSTRUCTIONS
Please follow-up with PCP for elevated blood pressure  Return to the emergency department if you experienced tongue swelling, throat swelling, difficulty breathing, difficulty swallowing

## 2023-09-11 NOTE — ED PROVIDER NOTES
Encounter Date: 9/10/2023       History     Chief Complaint   Patient presents with    Hypertension     Elevated BP yesterday. 160s systolic.      33 y.o. male with no pertinent PMHx presents to the ED with tongue pain x8 days.  He notes burning when he eats salty food only.  He denies tongue swelling, throat swelling, fever, shortness of breath, chest pain, dizziness, nausea, vomiting pain.    He also C/O elevated blood pressure two times at the pharmacy yesterday.  Both readings were in the 160s systolic.  He denies associated headache, visual changes, chest pain, shortness of breath, weakness.    The history is provided by the patient.     Review of patient's allergies indicates:  No Known Allergies  Past Medical History:   Diagnosis Date    Sickle cell anemia      No past surgical history on file.  No family history on file.  Social History     Tobacco Use    Smoking status: Never    Smokeless tobacco: Never   Substance Use Topics    Alcohol use: Not Currently    Drug use: Never     Review of Systems   HENT:  Negative for sore throat and trouble swallowing.         + tongue pain   Eyes:  Negative for visual disturbance.   Respiratory:  Negative for shortness of breath.    Cardiovascular:  Negative for chest pain.   Gastrointestinal:  Negative for abdominal pain, nausea and vomiting.   Neurological:  Negative for weakness.       Physical Exam     Initial Vitals [09/10/23 1823]   BP Pulse Resp Temp SpO2   139/87 95 16 98.9 °F (37.2 °C) 98 %      MAP       --         Physical Exam    Vitals reviewed.  Constitutional: He appears well-developed and well-nourished. He is not diaphoretic. No distress.   HENT:   Head: Normocephalic and atraumatic.   Nose: Nose normal.   Mouth/Throat: Uvula is midline, oropharynx is clear and moist and mucous membranes are normal. No oral lesions. No uvula swelling. No oropharyngeal exudate, posterior oropharyngeal edema or posterior oropharyngeal erythema.   No observed tongue swelling.   No oral lesions.  Uvula is midline.  No posterior pharynx swelling or erythema.   Eyes: Conjunctivae and EOM are normal.   Neck: Neck supple.   Cardiovascular:  Normal rate, regular rhythm, normal heart sounds and intact distal pulses.           Pulmonary/Chest: Breath sounds normal. No respiratory distress.   Musculoskeletal:      Cervical back: Neck supple.     Lymphadenopathy:     He has no cervical adenopathy.   Neurological: He is alert and oriented to person, place, and time.   Skin: No rash noted.   Psychiatric: He has a normal mood and affect. His behavior is normal. Judgment and thought content normal.         ED Course   Procedures  Labs Reviewed - No data to display       Imaging Results    None          Medications - No data to display  Medical Decision Making  33 y.o. male with no pertinent PMHx presents to the ED with tongue pain x8 days.  He is nontoxic appearing.  Hemodynamically stable.  Exam as above    Differential diagnosis includes abrasion, canker sore, Glossitis, allergic reaction,      No observed sores or abnormalities of tongue on physical exam.  His oropharynx is clear and without swelling, erythema, uvula deviation.  He is resting comfortably on exam.  Nontoxic appearing.  Blood pressure is 139/87 today.  Discussed lifestyle management of high blood pressure including diet and exercise.  Recommended avoiding salty, spicy, sour foods for 1-2 weeks until tongue pain resolves.  Follow-up given with PCP.  ED precautions given to return for new or concerning symptoms                               Clinical Impression:   Final diagnoses:  [K14.6] Tongue pain (Primary)  [R03.0] Elevated blood pressure reading        ED Disposition Condition    Discharge Stable          ED Prescriptions    None       Follow-up Information       Follow up With Specialties Details Why Contact Info Additional Information    [facility] Emergency Medicine  If symptoms worsen [address]  [place]  [facility] [id number] [phone]     [facility] Schedule an appointment as soon as possible for a visit   [address]  [facility] [address] [phone] [facility] Please park in surface lot and check in at central registration desk             Louise James PA-C  09/10/23 1947

## 2023-09-11 NOTE — ED TRIAGE NOTES
"32 yo M presents to the ED c/o hypertension yesterday. Also c/o tongue "burning when I eat salty foods." Denies PMH.   "